# Patient Record
Sex: MALE | Race: WHITE
[De-identification: names, ages, dates, MRNs, and addresses within clinical notes are randomized per-mention and may not be internally consistent; named-entity substitution may affect disease eponyms.]

---
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
## 2021-05-10 ENCOUNTER — HOSPITAL ENCOUNTER (EMERGENCY)
Dept: HOSPITAL 47 - EC | Age: 21
Discharge: HOME | End: 2021-05-10
Payer: MEDICAID

## 2021-05-10 VITALS — SYSTOLIC BLOOD PRESSURE: 115 MMHG | DIASTOLIC BLOOD PRESSURE: 70 MMHG

## 2021-05-10 VITALS — HEART RATE: 75 BPM | RESPIRATION RATE: 18 BRPM | TEMPERATURE: 97.9 F

## 2021-05-10 DIAGNOSIS — F17.200: ICD-10-CM

## 2021-05-10 DIAGNOSIS — F32.9: Primary | ICD-10-CM

## 2021-05-10 DIAGNOSIS — Z20.822: ICD-10-CM

## 2021-05-10 PROCEDURE — 90471 IMMUNIZATION ADMIN: CPT

## 2021-05-10 PROCEDURE — 82075 ASSAY OF BREATH ETHANOL: CPT

## 2021-05-10 PROCEDURE — 87635 SARS-COV-2 COVID-19 AMP PRB: CPT

## 2021-05-10 PROCEDURE — 90715 TDAP VACCINE 7 YRS/> IM: CPT

## 2021-05-10 PROCEDURE — 99284 EMERGENCY DEPT VISIT MOD MDM: CPT
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
## 2021-06-19 ENCOUNTER — HOSPITAL ENCOUNTER (EMERGENCY)
Dept: HOSPITAL 47 - EC | Age: 21
LOS: 1 days | Discharge: HOME | End: 2021-06-20
Payer: COMMERCIAL

## 2021-06-19 DIAGNOSIS — F41.9: ICD-10-CM

## 2021-06-19 DIAGNOSIS — F32.9: ICD-10-CM

## 2021-06-19 DIAGNOSIS — R10.12: Primary | ICD-10-CM

## 2021-06-19 DIAGNOSIS — F90.9: ICD-10-CM

## 2021-06-19 DIAGNOSIS — F17.290: ICD-10-CM

## 2021-06-19 DIAGNOSIS — F20.9: ICD-10-CM

## 2021-06-19 LAB
BASOPHILS # BLD AUTO: 0.1 K/UL (ref 0–0.2)
BASOPHILS NFR BLD AUTO: 1 %
EOSINOPHIL # BLD AUTO: 0.4 K/UL (ref 0–0.7)
EOSINOPHIL NFR BLD AUTO: 4 %
ERYTHROCYTE [DISTWIDTH] IN BLOOD BY AUTOMATED COUNT: 5.06 M/UL (ref 4.3–5.9)
ERYTHROCYTE [DISTWIDTH] IN BLOOD: 12.3 % (ref 11.5–15.5)
HCT VFR BLD AUTO: 43.1 % (ref 39–53)
HGB BLD-MCNC: 15 GM/DL (ref 13–17.5)
LYMPHOCYTES # SPEC AUTO: 3.1 K/UL (ref 1–4.8)
LYMPHOCYTES NFR SPEC AUTO: 26 %
MCH RBC QN AUTO: 29.7 PG (ref 25–35)
MCHC RBC AUTO-ENTMCNC: 34.8 G/DL (ref 31–37)
MCV RBC AUTO: 85.2 FL (ref 80–100)
MONOCYTES # BLD AUTO: 0.8 K/UL (ref 0–1)
MONOCYTES NFR BLD AUTO: 6 %
NEUTROPHILS # BLD AUTO: 7.4 K/UL (ref 1.3–7.7)
NEUTROPHILS NFR BLD AUTO: 62 %
PLATELET # BLD AUTO: 334 K/UL (ref 150–450)
WBC # BLD AUTO: 11.9 K/UL (ref 4–11)

## 2021-06-19 PROCEDURE — 82150 ASSAY OF AMYLASE: CPT

## 2021-06-19 PROCEDURE — 83690 ASSAY OF LIPASE: CPT

## 2021-06-19 PROCEDURE — 74177 CT ABD & PELVIS W/CONTRAST: CPT

## 2021-06-19 PROCEDURE — 96374 THER/PROPH/DIAG INJ IV PUSH: CPT

## 2021-06-19 PROCEDURE — 99284 EMERGENCY DEPT VISIT MOD MDM: CPT

## 2021-06-19 PROCEDURE — 80053 COMPREHEN METABOLIC PANEL: CPT

## 2021-06-19 PROCEDURE — 85025 COMPLETE CBC W/AUTO DIFF WBC: CPT

## 2021-06-19 NOTE — ED
Abdominal Pain HPI





- General


Chief Complaint: Abdominal Pain


Stated Complaint: Abd Pain


Time Seen by Provider: 06/19/21 23:32


Source: patient, family


Mode of arrival: ambulatory


Limitations: no limitations





- History of Present Illness


Initial Comments: 





This patient states that his 30 pound dog pounced on him and he is having left 

upper quadrant pain since that time.  Prescription about 3 hours ago.


MD Complaint: abdominal pain


Onset/Timing: 3


-: hour(s)


Location: [facility]


Radiation: none


Migration to: no migration


Severity: moderate


Quality: aching


Consistency: constant


Improves With: nothing


Worsens With: nothing


Associated Symptoms: denies other symptoms





- Related Data


                                Home Medications











 Medication  Instructions  Recorded  Confirmed


 


No Known Home Medications  04/30/19 04/30/19











                                    Allergies











Allergy/AdvReac Type Severity Reaction Status Date / Time


 


No Known Allergies Allergy   Verified 06/19/21 23:21














Review of Systems


ROS Statement: 


Those systems with pertinent positive or pertinent negative responses have been 

documented in the HPI.





ROS Other: All systems not noted in ROS Statement are negative.


Constitutional: Denies: fever, chills


Respiratory: Denies: cough, dyspnea


Cardiovascular: Denies: chest pain, palpitations


Gastrointestinal: Reports: abdominal pain.  Denies: nausea, vomiting, diarrhea, 

constipation, hematemesis


Genitourinary: Denies: dysuria, hematuria, testicular pain, testicular mass


Musculoskeletal: Denies: back pain


Skin: Denies: rash


Neurological: Denies: headache, weakness, numbness





Past Medical History


Past Medical History: No Reported History


Additional Past Medical History / Comment(s): schizophrenia


History of Any Multi-Drug Resistant Organisms: None Reported


Past Surgical History: No Surgical Hx Reported


Past Psychological History: ADD/ADHD, Anxiety, Depression, Schizophrenia


Smoking Status: Current every day smoker, Vaper


Past Alcohol Use History: None Reported


Past Drug Use History: None Reported





General Exam


Limitations: no limitations


General appearance: alert, in no apparent distress


Head exam: Present: atraumatic, normocephalic


Eye exam: Present: normal appearance.  Absent: scleral icterus, conjunctival 

injection


ENT exam: Present: normal oropharynx


Respiratory exam: Present: normal lung sounds bilaterally.  Absent: respiratory 

distress, wheezes, rales, rhonchi, stridor


Cardiovascular Exam: Present: regular rate, normal rhythm, normal heart sounds. 

Absent: systolic murmur, diastolic murmur, rubs, gallop


GI/Abdominal exam: Present: soft.  Absent: distended, tenderness, guarding, 

rebound, rigid, mass


Extremities exam: Present: normal inspection, normal capillary refill.  Absent: 

pedal edema, calf tenderness


Back exam: Present: normal inspection.  Absent: CVA tenderness (R), CVA 

tenderness (L)


Neurological exam: Present: alert


Skin exam: Present: warm, dry, intact, normal color.  Absent: rash





Course


                                   Vital Signs











  06/19/21 06/19/21





  23:18 23:21


 


Pulse Rate 77 65


 


Respiratory 20 22





Rate  


 


Blood Pressure 137/78 123/75


 


O2 Sat by Pulse 99 100





Oximetry  














Medical Decision Making





- Lab Data


Result diagrams: 


                                 06/19/21 23:40





                                 06/19/21 23:40


                                   Lab Results











  06/19/21 06/19/21 Range/Units





  23:40 23:40 


 


WBC  11.9 H   (4.0-11.0)  k/uL


 


RBC  5.06   (4.30-5.90)  m/uL


 


Hgb  15.0   (13.0-17.5)  gm/dL


 


Hct  43.1   (39.0-53.0)  %


 


MCV  85.2   (80.0-100.0)  fL


 


MCH  29.7   (25.0-35.0)  pg


 


MCHC  34.8   (31.0-37.0)  g/dL


 


RDW  12.3   (11.5-15.5)  %


 


Plt Count  334   (150-450)  k/uL


 


MPV  6.5   


 


Neutrophils %  62   %


 


Lymphocytes %  26   %


 


Monocytes %  6   %


 


Eosinophils %  4   %


 


Basophils %  1   %


 


Neutrophils #  7.4   (1.3-7.7)  k/uL


 


Lymphocytes #  3.1   (1.0-4.8)  k/uL


 


Monocytes #  0.8   (0-1.0)  k/uL


 


Eosinophils #  0.4   (0-0.7)  k/uL


 


Basophils #  0.1   (0-0.2)  k/uL


 


Sodium   141  (137-145)  mmol/L


 


Potassium   3.8  (3.5-5.1)  mmol/L


 


Chloride   104  ()  mmol/L


 


Carbon Dioxide   27  (22-30)  mmol/L


 


Anion Gap   10  mmol/L


 


BUN   15  (9-20)  mg/dL


 


Creatinine   0.87  (0.66-1.25)  mg/dL


 


Est GFR (CKD-EPI)AfAm   >90  (>60 ml/min/1.73 sqM)  


 


Est GFR (CKD-EPI)NonAf   >90  (>60 ml/min/1.73 sqM)  


 


Glucose   93  (74-99)  mg/dL


 


Calcium   9.6  (8.4-10.2)  mg/dL


 


Total Bilirubin   1.2  (0.2-1.3)  mg/dL


 


AST   29  (17-59)  U/L


 


ALT   35  (4-49)  U/L


 


Alkaline Phosphatase   81  ()  U/L


 


Total Protein   7.0  (6.3-8.2)  g/dL


 


Albumin   4.4  (3.5-5.0)  g/dL


 


Amylase   73  ()  U/L


 


Lipase   80  ()  U/L














Disposition


Clinical Impression: 


 Abdominal pain





Disposition: HOME SELF-CARE


Condition: Good


Instructions (If sedation given, give patient instructions):  Abdominal Pain 

(ED)


Is patient prescribed a controlled substance at d/c from ED?: No


Referrals: 


None,Stated [Primary Care Provider] - 1-2 days

## 2021-06-20 VITALS — RESPIRATION RATE: 18 BRPM | HEART RATE: 70 BPM | SYSTOLIC BLOOD PRESSURE: 113 MMHG | DIASTOLIC BLOOD PRESSURE: 71 MMHG

## 2021-06-20 LAB
ALBUMIN SERPL-MCNC: 4.4 G/DL (ref 3.5–5)
ALP SERPL-CCNC: 81 U/L (ref 38–126)
ALT SERPL-CCNC: 35 U/L (ref 4–49)
AMYLASE SERPL-CCNC: 73 U/L (ref 30–110)
ANION GAP SERPL CALC-SCNC: 10 MMOL/L
AST SERPL-CCNC: 29 U/L (ref 17–59)
BUN SERPL-SCNC: 15 MG/DL (ref 9–20)
CALCIUM SPEC-MCNC: 9.6 MG/DL (ref 8.4–10.2)
CHLORIDE SERPL-SCNC: 104 MMOL/L (ref 98–107)
CO2 SERPL-SCNC: 27 MMOL/L (ref 22–30)
GLUCOSE SERPL-MCNC: 93 MG/DL (ref 74–99)
LIPASE SERPL-CCNC: 80 U/L (ref 23–300)
POTASSIUM SERPL-SCNC: 3.8 MMOL/L (ref 3.5–5.1)
PROT SERPL-MCNC: 7 G/DL (ref 6.3–8.2)
SODIUM SERPL-SCNC: 141 MMOL/L (ref 137–145)

## 2021-06-20 NOTE — CT
EXAMINATION TYPE: CT abdomen pelvis w con

 

DATE OF EXAM: 6/20/2021

 

COMPARISON: 4/30/2019

 

HISTORY: [facility] pain

 

CT DLP: 568.2 mGycm

Automated exposure control for dose reduction was used.

 

CONTRAST: 

Performed with IV Contrast, patient injected with 100 mL of Isovue 300.

 

The lung bases are clear. There is no pleural effusion. Heart size is normal. There is no pericardial
 effusion.

 

Liver spleen stomach pancreas gallbladder appear intact. The bile ducts are not dilated. The liver is
 borderline enlarged and measures 20.7 cm in length.

 

There is no adrenal mass. Kidneys show satisfactory contrast opacification. There is no hydronephrosi
s. Ureters are not dilated. There is no retroperitoneal adenopathy. Delayed images show normal renal 
excretion. The bladder distends smoothly. There is no inguinal hernia. There is no free fluid in the 
pelvis. Appendix appears to measure up to 7 mm. I do not see any surrounding inflammation.

 

There is no mesenteric edema. There is no ascites or free air. There is no bowel obstruction.

 

The lumbar vertebra have normal alignment. There is no compression fracture. Disc spaces are fairly n
ormal. The bony pelvis is intact. Hip joints are intact. There is no hip dysplasia.

 

IMPRESSION:

Negative CT scan abdomen and pelvis. I do not see a cause for left upper quadrant pain. No adverse ch
ann compared to old exam.

## 2021-07-21 ENCOUNTER — HOSPITAL ENCOUNTER (INPATIENT)
Dept: HOSPITAL 47 - EC | Age: 21
LOS: 6 days | Discharge: HOME | DRG: 885 | End: 2021-07-27
Attending: PSYCHIATRY & NEUROLOGY | Admitting: PSYCHIATRY & NEUROLOGY
Payer: COMMERCIAL

## 2021-07-21 DIAGNOSIS — F15.10: ICD-10-CM

## 2021-07-21 DIAGNOSIS — Z59.0: ICD-10-CM

## 2021-07-21 DIAGNOSIS — G47.00: ICD-10-CM

## 2021-07-21 DIAGNOSIS — F20.9: ICD-10-CM

## 2021-07-21 DIAGNOSIS — F90.9: ICD-10-CM

## 2021-07-21 DIAGNOSIS — F32.9: ICD-10-CM

## 2021-07-21 DIAGNOSIS — F29: Primary | ICD-10-CM

## 2021-07-21 DIAGNOSIS — F17.200: ICD-10-CM

## 2021-07-21 DIAGNOSIS — Z78.1: ICD-10-CM

## 2021-07-21 DIAGNOSIS — F12.10: ICD-10-CM

## 2021-07-21 DIAGNOSIS — F19.10: ICD-10-CM

## 2021-07-21 DIAGNOSIS — F41.9: ICD-10-CM

## 2021-07-21 DIAGNOSIS — R45.851: ICD-10-CM

## 2021-07-21 PROCEDURE — 80306 DRUG TEST PRSMV INSTRMNT: CPT

## 2021-07-21 PROCEDURE — 99285 EMERGENCY DEPT VISIT HI MDM: CPT

## 2021-07-21 PROCEDURE — 82075 ASSAY OF BREATH ETHANOL: CPT

## 2021-07-21 SDOH — ECONOMIC STABILITY - HOUSING INSECURITY: HOMELESSNESS: Z59.0

## 2021-07-21 NOTE — ED
Psych HPI





- General


Source: patient, family, police, EMS, RN notes reviewed


Mode of arrival: EMS


Limitations: no limitations





<Samuel Miles - Last Filed: 07/21/21 14:49>





<Steve Barrios - Last Filed: 07/21/21 18:25>





- General


Chief Complaint: Psychiatric Symptoms


Stated Complaint: Suicidal


Time Seen by Provider: 07/21/21 14:22





- History of Present Illness


Initial Comments: 





This is a 21-year-old male presents emergency department via EMS with police for

psychiatric treatment.  Patient reportedly has been having increasing depression

and which she states has worsened secondary to his living situation.  Patient 

states that his girlfriend recently broke up with him in which he now is 

essentially homeless.  Patient states his depression worsened the point where he

wanted to kill himself today.  Patient was found in a tree stand with extension 

cord wrapped around his neck.  Patient denied jumped denies any trauma this 

time.  Patient does admit to marijuana use and states that the house that he was

having to stay at their using methamphetamines quite frequently.  Patient denies

any alcohol abuse.  Patient does have a history of psychiatric issues. 

(Samuel Miles)





- Related Data


                                Home Medications











 Medication  Instructions  Recorded  Confirmed


 


No Known Home Medications  04/30/19 07/21/21











                                    Allergies











Allergy/AdvReac Type Severity Reaction Status Date / Time


 


No Known Allergies Allergy   Verified 07/21/21 17:36














Review of Systems


ROS Other: All systems not noted in ROS Statement are negative.





<Samuel Miles - Last Filed: 07/21/21 14:49>


ROS Other: All systems not noted in ROS Statement are negative.





<Steve Barrios - Last Filed: 07/21/21 18:25>


ROS Statement: 


Those systems with pertinent positive or pertinent negative responses have been 

documented in the HPI.








Past Medical History


Past Medical History: No Reported History


Additional Past Medical History / Comment(s): schizophrenia


History of Any Multi-Drug Resistant Organisms: None Reported


Past Surgical History: No Surgical Hx Reported


Past Psychological History: ADD/ADHD, Anxiety, Depression, Schizophrenia


Smoking Status: Current every day smoker, Vaper


Past Alcohol Use History: None Reported


Past Drug Use History: None Reported





<Samuel Miles - Last Filed: 07/21/21 14:49>





General Exam


Limitations: no limitations


General appearance: alert, in no apparent distress


Head exam: Present: atraumatic, normocephalic, normal inspection


Eye exam: Present: normal appearance, PERRL, EOMI.  Absent: scleral icterus, 

conjunctival injection, periorbital swelling


ENT exam: Present: normal exam, normal oropharynx, mucous membranes moist


Neck exam: Present: normal inspection, full ROM.  Absent: tenderness, 

meningismus, lymphadenopathy


Respiratory exam: Present: normal lung sounds bilaterally.  Absent: respiratory 

distress, wheezes, rales, rhonchi, stridor


Cardiovascular Exam: Present: regular rate, normal rhythm, normal heart sounds. 

Absent: systolic murmur, diastolic murmur, rubs, gallop, clicks


Neurological exam: Present: alert, oriented X3, CN II-XII intact


Psychiatric exam: Present: depressed


Skin exam: Present: warm, dry, intact, normal color.  Absent: rash





<Samuel Miles - Last Filed: 07/21/21 14:49>





Course





                                   Vital Signs











  07/21/21 07/21/21





  14:24 17:43


 


Temperature 97.5 F L 


 


Pulse Rate 102 H 68


 


Respiratory 18 20





Rate  


 


Blood Pressure 116/73 127/75


 


O2 Sat by Pulse 98 98





Oximetry  














Medical Decision Making





<Steve Barrios - Last Filed: 07/21/21 18:25>





- Medical Decision Making





Patient seen by mental health services with plans for admission.  Patient 

reevaluated by myself and clinical certificate completed.  Patient admits to hav

ing depression with thoughts of self-harm by hanging.  Patient also admits to 

making threats.  Patient has not been sleeping well.  Patient has not been 

eating well. (Steve Barrios)





- Lab Data





                                   Lab Results











  07/21/21 Range/Units





  17:45 


 


Urine Opiates Screen  Not Detected  (NotDetected)  


 


Ur Oxycodone Screen  Not Detected  (NotDetected)  


 


Urine Methadone Screen  Not Detected  (NotDetected)  


 


Ur Propoxyphene Screen  Not Detected  (NotDetected)  


 


Ur Barbiturates Screen  Not Detected  (NotDetected)  


 


U Tricyclic Antidepress  Not Detected  (NotDetected)  


 


Ur Phencyclidine Scrn  Not Detected  (NotDetected)  


 


Ur Amphetamines Screen  Detected H  (NotDetected)  


 


U Methamphetamines Scrn  Detected H  (NotDetected)  


 


U Benzodiazepines Scrn  Not Detected  (NotDetected)  


 


Urine Cocaine Screen  Not Detected  (NotDetected)  


 


U Marijuana (THC) Screen  Detected H  (NotDetected)  














Disposition





<Samuel Miles - Last Filed: 07/21/21 14:49>


Decision Time: 18:25





<Steve Barrios - Last Filed: 07/21/21 18:25>


Clinical Impression: 


 Depression, Suicidal ideation





Disposition: TRANSFER TO PSYCH HOSP/UNIT


Condition: Stable


Referrals: 


None,Stated [Primary Care Provider] - 1-2 days

## 2021-07-22 VITALS — RESPIRATION RATE: 14 BRPM

## 2021-07-22 RX ADMIN — NICOTINE SCH: 14 PATCH, EXTENDED RELEASE TRANSDERMAL at 03:50

## 2021-07-22 RX ADMIN — NICOTINE SCH: 14 PATCH, EXTENDED RELEASE TRANSDERMAL at 07:40

## 2021-07-22 NOTE — P.HP
Psychiatric H&P





- .


H&P Date: 07/22/21


History & Physical: 


                                    Allergies











Allergy/AdvReac Type Severity Reaction Status Date / Time


 


No Known Allergies Allergy   Verified 07/21/21 17:36








                                   Vital Signs











Temp  97.3 F L  07/22/21 06:48


 


Pulse  69   07/22/21 06:48


 


Resp  18   07/22/21 06:48


 


BP  127/61   07/22/21 06:48


 


Pulse Ox  98   07/21/21 22:09








                                 Intake & Output











 07/21/21 07/22/21 07/22/21





 18:59 06:59 18:59


 


Weight 65.771 kg 62 kg 








                             Laboratory Last Values











Urine Opiates Screen  Not Detected  (NotDetected)   07/21/21  17:45    


 


Ur Oxycodone Screen  Not Detected  (NotDetected)   07/21/21  17:45    


 


Urine Methadone Screen  Not Detected  (NotDetected)   07/21/21  17:45    


 


Ur Propoxyphene Screen  Not Detected  (NotDetected)   07/21/21  17:45    


 


Ur Barbiturates Screen  Not Detected  (NotDetected)   07/21/21  17:45    


 


U Tricyclic Antidepress  Not Detected  (NotDetected)   07/21/21  17:45    


 


Ur Phencyclidine Scrn  Not Detected  (NotDetected)   07/21/21  17:45    


 


Ur Amphetamines Screen  Detected  (NotDetected)  H  07/21/21  17:45    


 


U Methamphetamines Scrn  Detected  (NotDetected)  H  07/21/21  17:45    


 


U Benzodiazepines Scrn  Not Detected  (NotDetected)   07/21/21  17:45    


 


Urine Cocaine Screen  Not Detected  (NotDetected)   07/21/21  17:45    


 


U Marijuana (THC) Screen  Detected  (NotDetected)  H  07/21/21  17:45    











07/22/21 13:12


IDENTIFYING DATA: Patient is a 21-year-old  male who has a history of 

polysubstance abuse and is currently homeless





HPI: Patient presented to the hospital via EMS with police for psychiatric 

treatment and valuation.  Patient apparently had reported an increase in his 

depression due to his living situation in the ER.  He was claiming that he is 

essentially homeless at this time.  Patient also mentioned that he had thoughts 

of wanting to kill himself today on the day of coming into the hospital.  

Patient was found in a tree stand with extension cord wrapped around his neck 

according to ER report.  He was admitting to methamphetamine and cannabis use.  

Patient was petitioned by his aunt who claims that patient was posting suicidal 

messages on face book and "sending videos with cord around neck, standing on the

edge, police arrived, threatened to jump from tree stand, lack of support, 

feeling hopeless.  Inconsistent with treatment, stressing about medical 

conditions and being alone".  Patient was noted by staff to be aggressive and 

agitated in the hallways early this afternoon and was about to receive prn IM 

medication for agitation however patient went down to the floor and began 

shaking and required restraining by staff members. the A team was called to 

evaluate patient for possible seizure-like episode however patient did not lose 

consciousness did not lose any bowel incontinence or did not have any postictal 

confusion or tongue biting therefore it was decided that patient will be 

monitored on the psychiatric unit for now.  The writer was called to evaluate 

patient as he was being restrained on the floor and patient was loud aggressive 

and making threatening comments towards staff members.  He threatened to "go to 

[place] with this".  He was difficult to redirect during conversation and was 

brought to the seclusion room and put into restraints.  Patient was minimally 

cooperative with writer today and claims that he is feeling calmer after 

receiving the Haldol and Ativan.  She claims that he does not know why he is 

hospital and "misses my daughter".  She appeared to have fairly poor insight and

judgment and was impulsive.  He states that he was using drugs including 

methamphetamine and cannabis.  He states that he is homeless at this time.  He 

followed minimal directions and appeared to be tearful.





Patient denies any suicidal or homicidal ideations intent or plan.  At this time

patient denies any auditory or visual hallucinations.  





PAST PSYCHIATRIC HISTORY: Unable to gather further psychiatric history.  Patient

has no previous psychiatric admissions.





PMH: Unable to obtain





ALLERGIES: as per EMR





CHEMICAL DEPENDENCY HISTORY: as per HPI





FAMILY PSYCHIATRIC/SUBSTANCE USE HISTORY:  Unable to obtain





SOCIAL HISTORY: He states that he is currently homeless.  He states that he has 

one daughter.  Unable to obtain further social history.





MENTAL STATUS EXAM: 


General Appearance: Patient appears to be disheveled in appearance, stated age 

is alert, aggressive and loud. Patient appears to have poor hygiene and groomin

g.


Behavior: Aggressive, uncooperative


Speech: Patient's speech is loud and threatening


Mood/Affect: Patient reports their mood is "fine", affect is incongruent 


Suicidality/Homicidality:  Patient denies having any homicidal ideation intent 

or plan. Denies any suicidal ideations intent or plan  


Perceptions: Patient denies any visual hallucinations and denies any auditory 

hallucinations


Though content/process: Bizarre, threatening.  [place]. 


Memory and concentration: AOX2-3, grossly intact for the purposes of this 

session. Cannot spell "WORLD" backwards


Judgment and insight: poor





STRENGTHS/WEAKNESSES: strength is that patient is resilient. Weakness is that 

patient has poor judgment and is impulsive





INTELLECT: average





IMPRESSIONS: 


Psychosis unspecified, rule out secondary to polysubstance abuse


Methamphetamine abuse


Cannabis use disorder


Nicotine dependence





PLAN: 


-Patient is admitted under involuntary status to [facility] for stabilization of 

psychiatric symptoms and safety. Patient has not signed  medication consent and 

is placed in patient's chart. A second certification was completed and along 

with petition will be filed for court.


-Medications : Will start patient on Zyprexa by mouth 5 mg daily at bedtime for 

psychosis/insomnia/mood stabilization.


-Ativan, Benadryl, and Haldol PRN for agitation/aggression


-Patient was informed of the risks, benefits and side effects of the medication 

and patient verbally consented to taking the medications. Patient signed med 

consent form and was placed in chart.


-Internal Medicine consult to perform medical evaluation and physical.


-NRT - nicotine patch


-SW on board for discharge planning. Encourage patient to participate in groups 

to work on coping skills. Will await deferral and court date.  We'll attempt to 

speak with patient about substance use rehab and other treatment options for his

substance abuse.


07/22/21 13:17

## 2021-07-22 NOTE — P.MHFACE
Face to Face Restrain/Seclus





- Evaluation


Patient's Immediate Situation: Endangers self safety, Endangers staff safety


Patient's Immediate Situation - Comment: 





aggressive, loud and threatening


Patient's Reaction to the Intervention: Uncooperative, Angry, Hostile, Moon

gerent, Aggressive


Patient's Reaction to the Intervention - Comment: 





combative


Patient's Medical & Behavioral Condition: Alert, Agitated


Need to Continue or Terminate Restraint or Seclusion: Continue


Need to Continue or Terminate Restraint/Seclusion - Comment: 





patient did not respond to redirection and behavioral interventions to de 

escalate and required PRN IM medications however was still aggressive and 

combative, yelling profanities.


Face to Face Eval of Restraint Date: 07/22/21


Face to Face Eval of Restraint Time: 01:00

## 2021-07-23 RX ADMIN — PALIPERIDONE SCH MG: 3 TABLET, EXTENDED RELEASE ORAL at 21:41

## 2021-07-23 RX ADMIN — NICOTINE SCH: 14 PATCH, EXTENDED RELEASE TRANSDERMAL at 08:35

## 2021-07-23 NOTE — P.PN
Progress Note - Text


Progress Note Date: 07/23/21





Interval History:


Patient was seen lying in the quiet room this morning sleeping.  Patient appea

red to be fairly sedated and minimally engaged with writer.  He awoke briefly 

however went back to sleep.  He answered a few questions.  He states that he is 

doing "all right" and did not have any overnight complaints.  He states that 

"the medications making me tired".  He was agreeable to take another 

antipsychotic.  He claims that "I don't want to talk about it" when asked about 

why he came to the hospital.  He is denying any depression or anxiety today. At 

this time patient denies any suicidal or homical ideations, intent or plan. 

Patient denies any auditory, visual hallucinations and denies any paranoia or 

delusions.





Mental Status Exam:


General Appearance: Patient appears to be disheveled in appearance, stated age 

is somnolent, constricted. Patient appears to have poor hygiene and grooming.


Behavior: Patient is laying in his bed, no agitation.  Somnolent.


Speech: Patient's speech is soft and concrete


Mood/Affect: Patient reports their mood is "ok", affect is incongruent and 

constricted


Suicidality/Homicidality:  Patient denies having any homicidal ideation intent 

or plan. Denies any suicidal ideations intent or plan  


Perceptions: Patient denies any visual hallucinations and denies any auditory 

hallucinations


Though content/process: Poverty of content, poverty of speech.  Logical.


Memory and concentration: AOX2-3, grossly intact for the purposes of this 

session.


Judgment and insight: poor





Assessment


Psychosis unspecified, rule out secondary to polysubstance abuse


Methamphetamine abuse


Cannabis use disorder


Nicotine dependence





Plan:


-Patient continues to meet criteria for inpatient psychiatric admission for 

symptom stabilization and safety. Patient has signed adult voluntary form and 

medication consent and was placed in patient's chart.


-Medications: Discontinued Zyprexa due to oversedation and replaced with 

paliperidone by mouth 3 mg daily at bedtime for mood stabilization/psychosis.  

If needed this can be increased over the weekend.


-When necessary Ativan, benadryl and Haldol for agitation/aggression.


-NRT - nicotine patch


-SW on board for discharge planning.  Encouraged the patient to participate in 

milieu.  Patient deferred an agreed to continue treatment with his  

today. We'll attempt to speak with patient about substance use rehab and other 

treatment options for his substance abuse.

## 2021-07-24 VITALS — HEART RATE: 59 BPM | TEMPERATURE: 98 F

## 2021-07-24 VITALS — SYSTOLIC BLOOD PRESSURE: 113 MMHG | DIASTOLIC BLOOD PRESSURE: 57 MMHG

## 2021-07-24 RX ADMIN — NICOTINE SCH: 14 PATCH, EXTENDED RELEASE TRANSDERMAL at 09:04

## 2021-07-24 RX ADMIN — PALIPERIDONE SCH MG: 3 TABLET, EXTENDED RELEASE ORAL at 21:57

## 2021-07-24 NOTE — P.PN
Progress Note - Text


Progress Note Date: 07/24/21





Interval History:


Patient was seen wandering the hallways and was directable and agreeable to sp

guille with writer in the office.  This is a 21-year-old single occasion male.  

Patient was admitted due to having thoughts of suicide and bizarre thinking.  

Currently he is being treated for psychosis and methamphetamine use disorder.  

He indicated tolerating well.  On well.  Patient states he woke up this morning 

feeling better.  He states that he slept well last night.  Efforts are made 

during this session improving his insight on how methamphetamine use is 

affecting his physical and mental well-being.  Patient said he will consider 

going to NA meetings to help maintain sobriety.. At this time patient denies any

suicidal or homical ideations, intent or plan. Patient denies any auditory, 

visual hallucinations and denies any paranoia or delusions. Patient denies any 

side effects from the medications and has been compliant with meds. 





Mental Status Exam:


General Appearance: [Patient appears to be stated age is alert, directable, 

difficult to engage, has poor hygiene


Behavior: Patient is calmly seated without any agitated behavior.


Speech: Patient's speech is fluent and nonpressured. 


Mood/Affect: Mood is improving mildly, affect is congruent and constricted. 


Suicidality/Homicidality:  Patient denies having any suicidal or homicidal 

ideation intent or plan.  


Perceptions: Patient denies any visual hallucinations and denies any auditory 

hallucinations  


Though content/process: There is no evidence of any delusional thought content 

and thought process is linear and goal-directed. 


Memory and concentration: AOX3, grossly intact for the purposes of this session


Judgment and insight: Improving mildly





Assessment


- Psychosis and specified rule out substance induced psychosis


- Methamphetamine use disorder


-Cannabis use disorder


-Nicotine use disorder








Plan:


-Patient continues to meet criteria for inpatient psychiatric admission for 

symptom stabilization and safety. 


-Medications: Continue paliperidone 3 mg


- Efforts are  made during the session improving his insight into how 

methamphetamine use affected his health and mental well-being, encouraged NA 

meetings consider substance abuse treatment


-When necessary Ativan and Haldol for agitation/aggression.


-NRT - nicotine patch


-SW on board for discharge planning.  Encouraged the patient to participate in 

milieu. Currently awaiting deferral with  and court date.

## 2021-07-25 RX ADMIN — PALIPERIDONE SCH MG: 3 TABLET, EXTENDED RELEASE ORAL at 20:48

## 2021-07-25 RX ADMIN — NICOTINE SCH PATCH: 14 PATCH, EXTENDED RELEASE TRANSDERMAL at 18:54

## 2021-07-25 RX ADMIN — NICOTINE SCH: 14 PATCH, EXTENDED RELEASE TRANSDERMAL at 08:57

## 2021-07-25 NOTE — P.PN
Progress Note - Text


Progress Note Date: 07/25/21





Interval History:


Patient was seen wandering the hallways and was directable and agreeable to sp

guille with writer in the office.  This is a 21-year-old single occasion male.  

Patient was admitted due to having thoughts of suicide and bizarre thinking.  

Currently he is being treated for psychosis and methamphetamine use disorder.  

Patient reported feeling better.  He states that he slept well last night.  

Patient states his sobriety plan is to stay away from people that are still 

using methamphetamine and might consider going to NA meetings to help maintain 

sobriety. Efforts are made during this session improving his insight on how 

methamphetamine use is affecting his physical and mental well-being.  . At this 

time patient denies any suicidal or homical ideations, intent or plan. Patient 

denies any auditory, visual hallucinations and denies any paranoia or delusions.

Patient denies any side effects from the medications and has been compliant with

meds. 





Mental Status Exam:


General Appearance: [Patient appears to be stated age is alert, directable, 

difficult to engage, has poor hygiene


Behavior: Patient is calmly seated without any agitated behavior.


Speech: Patient's speech is fluent and nonpressured. 


Mood/Affect: Mood is improving mildly, affect is congruent and constricted. 


Suicidality/Homicidality:  Patient denies having any suicidal or homicidal 

ideation intent or plan.  


Perceptions: Patient denies any visual hallucinations and denies any auditory 

hallucinations  


Though content/process: There is no evidence of any delusional thought content 

and thought process is linear and goal-directed. 


Memory and concentration: AOX3, grossly intact for the purposes of this session


Judgment and insight: Improving mildly





Assessment


- Psychosis and specified rule out substance induced psychosis


- Methamphetamine use disorder


-Cannabis use disorder


-Nicotine use disorder








Plan:


-Patient continues to meet criteria for inpatient psychiatric admission for 

symptom stabilization and safety. 


-Medications: Continue paliperidone 3 mg nightly and monitor


- Efforts are  made during the session improving his insight into how 

methamphetamine use affected his health and mental well-being, encouraged NA 

meetings consider substance abuse treatment


-When necessary Ativan and Haldol for agitation/aggression.


-NRT - nicotine patch


-SW on board for discharge planning.  Encouraged the patient to participate in 

milieu. Currently awaiting deferral with  and court date

## 2021-07-26 RX ADMIN — NICOTINE SCH PATCH: 14 PATCH, EXTENDED RELEASE TRANSDERMAL at 20:06

## 2021-07-26 RX ADMIN — NICOTINE SCH PATCH: 14 PATCH, EXTENDED RELEASE TRANSDERMAL at 08:32

## 2021-07-26 RX ADMIN — PALIPERIDONE SCH MG: 3 TABLET, EXTENDED RELEASE ORAL at 20:03

## 2021-07-26 NOTE — P.PN
Progress Note - Text


Progress Note Date: 07/26/21





Interval History:


Patient was seen today for psychiatric follow-up regarding patient's psychosis 

and polysubstance abuse.  Patient claims that he is feeling "bored" on the unit 

when asked how he was doing over the weekend and today.  He states that he is 

not feeling depressed or anxious today.  He claims that he has been taking his 

paliperidone over the weekend is denying any problems with it.  He has fairly 

superficial insight into his condition and claims that "stop using meth whenever

I want".  He is refusing rehab at this point.  He states that he did not sleep 

well last night and was agreeable to take trazodone as needed for tonight if he 

cannot sleep again.  He appears to be fairly calm during the interaction today. 

He is denying any depression or anxiety today. At this time patient denies any 

suicidal or homical ideations, intent or plan. Patient denies any auditory, 

visual hallucinations and denies any paranoia or delusions.





Mental Status Exam:


General Appearance: Patient appears to be stated age is wake today, attempts to 

cooperate. Patient appears to have improving hygiene and grooming.


Behavior: Patient is laying in his bed, no agitation.  More cooperative today


Speech: Patient's speech is soft and concrete


Mood/Affect: Patient reports their mood is "good", affect is congruent and 

constricted


Suicidality/Homicidality:  Patient denies having any homicidal ideation intent 

or plan. Denies any suicidal ideations intent or plan  


Perceptions: Patient denies any visual hallucinations and denies any auditory 

hallucinations


Though content/process: Poverty of content, poverty of speech.  Logical.


Memory and concentration: AOX2-3, grossly intact for the purposes of this 

session.


Judgment and insight: Chronically poor, improving mildly





Assessment


Psychosis unspecified, rule out secondary to polysubstance abuse


Methamphetamine abuse


Cannabis use disorder


Nicotine dependence





Plan:


-Patient continues to meet criteria for inpatient psychiatric admission for 

symptom stabilization and safety. Patient has signed adult voluntary form and 

medication consent and was placed in patient's chart.


-Medications: paliperidone by mouth 3 mg daily at bedtime for mood 

stabilization/psychosis.  I added trazodone 50 mg daily at bedtime when 

necessary for insomnia.


-When necessary Ativan, benadryl and Haldol for agitation/aggression.


-NRT - nicotine patch


-SW on board for discharge planning.  Encouraged the patient to participate in 

milieu.  Patient signed a deferral with his  and agreeable to continue 

with treatment.  Patient is declining substance rehab at this time however is 

currently homeless.  He states that he may have friends and his mother's friend 

that he could stay with.  Patient to speak with  today to make 

arrangements for discharge planning for tomorrow.

## 2021-07-27 RX ADMIN — NICOTINE SCH PATCH: 14 PATCH, EXTENDED RELEASE TRANSDERMAL at 09:20

## 2021-07-27 NOTE — P.DS
Providers


Date of admission: 


07/21/21 20:21





Expected date of discharge: 07/27/21


Attending physician: 


Jonas Burnette MD





Consults: 





                                        





07/21/21 20:38


Consult Physician Routine 


   Consulting Provider: [facility]


   Consult Reason/Comments: medical management/history and physical


   Do you want consulting provider notified?: Yes











Primary care physician: 


Stated None








- Discharge Diagnosis(es)


(1) Unspecified psychosis


Current Visit: Yes   Status: Acute   Priority: High   





(2) Methamphetamine abuse


Current Visit: Yes   Status: Acute   Priority: High   





(3) Cannabis use disorder, mild, abuse


Current Visit: Yes   Status: Acute   Priority: Medium   





(4) Nicotine dependence


Current Visit: Yes   Status: Acute   Priority: Low   


Hospital Course: 





Admission HPI:


Admission note was completed by writer "Patient is a 21-year-old  male 

who has a history of polysubstance abuse and is currently homeless. Patient 

presented to the hospital via EMS with police for psychiatric treatment and 

valuation.  Patient apparently had reported an increase in his depression due to

his living situation in the ER.  He was claiming that he is essentially homeless

at this time.  Patient also mentioned that he had thoughts of wanting to kill 

himself today on the day of coming into the hospital.  Patient was found in a 

tree stand with extension cord wrapped around his neck according to ER report.  

He was admitting to methamphetamine and cannabis use.  Patient was petitioned by

his aunt who claims that patient was posting suicidal messages on face book and 

"sending videos with cord around neck, standing on the edge, police arrived, 

threatened to jump from tree stand, lack of support, feeling hopeless.  

Inconsistent with treatment, stressing about medical conditions and being 

alone".  Patient was noted by staff to be aggressive and agitated in the 

hallways early this afternoon and was about to receive prn IM medication for 

agitation however patient went down to the floor and began shaking and required 

restraining by staff members. the A team was called to evaluate patient for 

possible seizure-like episode however patient did not lose consciousness did not

lose any bowel incontinence or did not have any postictal confusion or tongue 

biting therefore it was decided that patient will be monitored on the 

psychiatric unit for now.  The writer was called to evaluate patient as he was 

being restrained on the floor and patient was loud aggressive and making 

threatening comments towards staff members.  He threatened to "go to [place] 

with this".  He was difficult to redirect during conversation and was brought to

the seclusion room and put into restraints.  Patient was minimally cooperative 

with writer today and claims that he is feeling calmer after receiving the 

Haldol and Ativan.  She claims that he does not know why he is hospital and 

"misses my daughter".  She appeared to have fairly poor insight and judgment and

was impulsive.  He states that he was using drugs including methamphetamine and 

cannabis.  He states that he is homeless at this time.  He followed minimal 

directions and appeared to be tearful. Patient denies any suicidal or homicidal 

ideations intent or plan.  At this time patient denies any auditory or visual 

hallucinations."





Hospital course:


Upon admission to the unit patient was initially agitated, psychotic and 

aggressive. Patient was however admitted involuntarily and ended up signing a 

deferral and agreeing to treatment with his . Patient was initially 

agitated and aggressive and required restraints and prn medications to be given 

however after that he got along well with other patients on the unit and 

followed unit protocol.  Patient was compliant with the medications and denied 

any side effects throughout hospital course.  Patient was started on palipe

ridone by mouth 3 mg daily at bedtime for mood stabilization/psychosis.  Patient

was also started on trazodone 50 mg daily at bedtime when necessary for 

insomnia.  Patient spoke of his stressors and engaged in therapy both group and 

individual.  Patient was also seen by medical team for history and physical 

exam.  Throughout the course of the hospitalization patient gradually improved 

with regards to mood, psychosis, anxiety, sleep and return back to his baseline 

level of functioning. On the day of discharge patient denied any suicidal or 

homicidal ideations intent or plan denied any auditory or visual hallucinations.

Patient endorsed wanting to live for his health and family.  The patient denied 

any access to guns or weapons.  Patient denied any paranoia and did not endorse 

any delusions.  Patient does have a significant history of substance abuse and 

was counseled on abstaining from all substances including alcohol and marijuana.

Patient was offered however declined inpatient substance-abuse rehab.  He claims

that he would like to cut back methamphetamine use on his own.  Patient was also

counseled on the medications and need for regular compliance and was encouraged 

to follow-up with their outpatient appointment for mental health and also for 

primary care.  Prior to discharge a family meeting will be arranged by social 

worker to answer any questions and ensure safety upon discharge.





Mental status exam:


General Appearance: Patient appears to be then, stated age is alert, directable,

and cooperative. Patient is in no acute distress and has improved hygiene and 

grooming 


Behavior: Patient is calmly seated without any agitated behavior.


Speech: Patient's speech is fluent and nonpressured. 


Mood/Affect: Patient reports their mood is "good", affect is congruent


Suicidality/Homicidality:  Patient denies having any suicidal or homicidal 

ideation intent or plan.  


Perceptions: Patient denies any auditory or visual hallucinations.  


Though content/process: There is no evidence of any delusional thought content 

and thought process is linear and goal-directed. 


Memory and concentration: AOX3, grossly intact for the purposes of this session.

Can spell "WORLD" backwards correctly.


Judgment and insight: chronically poor, however has improved with guarded 

prognosis





Impression:


Psychosis unspecified, rule out substance-induced psychotic episode


Methamphetamine abuse


Cannabis use disorder


Nicotine dependence





Plan:


-Continue with discharge today as patient has improved and stabilized 

psychiatrically and is not currently an imminent threat to himself and/or 

others. Patient will remain at chronically elevated risk for harm to self and/or

others due to his impulsivity and polysubstance abuse.


-Continue medications: Paliperidone by mouth 3 mg daily at bedtime for mood 

stabilization/psychosis, trazodone 50 mg daily at bedtime when necessary for 

insomnia.


-Patient was counseled on the need for medication compliance and appropriate 

follow-up at mental health and also primary care for medical issues.  Patient 

verbalized understanding and agreed.


-Social work to arrange for and conduct family meeting to ensure safety upon 

discharge and answer any questions/concerns. Social work also to arrange for 

patients follow up appointments for psychiatric care along with follow up with 

primary care provider.


-Patient counseled on abstaining from recreational drugs and marijuana and 

alcohol. Was informed/educated on the adverse effects on their physical and 

mental health. Patient verbally agreed and understood. Patient was offered 

substance abuse treatment however declined at this time.  He claims that he 

wants to cut back on his substance use on his own.


-Patient was instructed to return to the hospital or seek immediate medical care

if their psychiatric or medical symptoms do worsen or reoccur.








                                    Allergies











Allergy/AdvReac Type Severity Reaction Status Date / Time


 


No Known Allergies Allergy   Verified 07/21/21 17:36











                               Laboratory Results











Urine Opiates Screen  Not Detected  (NotDetected)   07/21/21  17:45    


 


Ur Oxycodone Screen  Not Detected  (NotDetected)   07/21/21  17:45    


 


Urine Methadone Screen  Not Detected  (NotDetected)   07/21/21  17:45    


 


Ur Propoxyphene Screen  Not Detected  (NotDetected)   07/21/21  17:45    


 


Ur Barbiturates Screen  Not Detected  (NotDetected)   07/21/21  17:45    


 


U Tricyclic Antidepress  Not Detected  (NotDetected)   07/21/21  17:45    


 


Ur Phencyclidine Scrn  Not Detected  (NotDetected)   07/21/21  17:45    


 


Ur Amphetamines Screen  Detected  (NotDetected)  H  07/21/21  17:45    


 


U Methamphetamines Scrn  Detected  (NotDetected)  H  07/21/21  17:45    


 


U Benzodiazepines Scrn  Not Detected  (NotDetected)   07/21/21  17:45    


 


Urine Cocaine Screen  Not Detected  (NotDetected)   07/21/21  17:45    


 


U Marijuana (THC) Screen  Detected  (NotDetected)  H  07/21/21  17:45    











                                   Vital Signs











Temp  98.0 F   07/24/21 06:49


 


Pulse  59 L  07/24/21 06:49


 


Resp  14   07/24/21 06:49


 


BP  113/57   07/24/21 06:49


 


Pulse Ox  98   07/22/21 18:33











Patient Condition at Discharge: Stable





Plan - Discharge Summary


Discharge Rx Participant: No


New Discharge Prescriptions: 


New


   traZODone HCL [Desyrel] 50 mg PO HS PRN 30 Days  tab


     PRN Reason: Insomnia


   Paliperidone [Invega] 3 mg PO HS 30 Days  tab.er.24


   Nicotine 14Mg/24Hr Patch [Habitrol] 1 patch TRANSDERM DAILY 14 Days  patch


Discharge Medication List





Nicotine 14Mg/24Hr Patch [Habitrol] 1 patch TRANSDERM DAILY 14 Days  patch 

07/27/21 [Rx]


Paliperidone [Invega] 3 mg PO HS 30 Days  tab.er.24 07/27/21 [Rx]


traZODone HCL [Desyrel] 50 mg PO HS PRN 30 Days  tab 07/27/21 [Rx]








Follow up Appointment(s)/Referral(s): 


[facility] [NON-STA[facility]] - 1 Week


Patient Instructions/Handouts:  How to Stop Smoking (DC), Depression (DC)


Activity/Diet/Wound Care/Special Instructions: 


Activity and diet as tolerated. Avoid the use of street drugs and alcohol. Take 

all medications as prescribed. When you are in need of refills on your 

medications please contact your medical provider and/or outpatient psychiatrist 

to have this done. Please go to scheduled outpatient appointment for aftercare 

treatment. If symptoms return or become worse, call the crisis line at 

[phone] and/or go to the nearest emergency room for evaluation. 


Discharge Disposition: HOME SELF-CARE

## 2021-08-22 ENCOUNTER — HOSPITAL ENCOUNTER (EMERGENCY)
Dept: HOSPITAL 47 - EC | Age: 21
End: 2021-08-22
Payer: COMMERCIAL

## 2021-08-22 VITALS
HEART RATE: 57 BPM | TEMPERATURE: 98.3 F | SYSTOLIC BLOOD PRESSURE: 117 MMHG | DIASTOLIC BLOOD PRESSURE: 71 MMHG | RESPIRATION RATE: 20 BRPM

## 2021-08-22 DIAGNOSIS — Z53.21: Primary | ICD-10-CM

## 2021-08-22 PROCEDURE — 99499 UNLISTED E&M SERVICE: CPT

## 2021-08-22 PROCEDURE — 93005 ELECTROCARDIOGRAM TRACING: CPT

## 2021-08-22 NOTE — ED
General Adult HPI





- General


Stated complaint: heart racing





- History of Present Illness


Initial comments: 


Advanced triage:


21-year-old male presenting to the emergency department with chief complaint of 

a racing heart.  States the symptoms can occur intermittently with no any prec

ipitating factors.  He also reports associated chest pain and he feels like his 

"chest is sinking."  States the heart rate is going up to 130 bpm according to 

his smart watch.  The sensation is located on the left side of chest without any

radiation.  Also reports associated exertional dyspnea.  States he has history 

of anxiety, depression and schizophrenia.  This may feel some collected.  States

this may feel somewhat similar to his acute anxiety. 


Patient resting comfortably in triage and playing on his phone.  Heart rate 72. 

No symptoms at this time.





- Related Data


                                  Previous Rx's











 Medication  Instructions  Recorded


 


Nicotine 14Mg/24Hr Patch [Habitrol] 1 patch TRANSDERM DAILY 14 Days 07/27/21





 patch 


 


Paliperidone [Invega] 3 mg PO HS 30 Days  tab.er.24 07/27/21


 


traZODone HCL [Desyrel] 50 mg PO HS PRN 30 Days  tab 07/27/21











                                    Allergies











Allergy/AdvReac Type Severity Reaction Status Date / Time


 


No Known Allergies Allergy   Verified 07/21/21 17:36














Review of Systems


ROS Statement: 


Those systems with pertinent positive or pertinent negative responses have been 

documented in the HPI.





ROS Other: All systems not noted in ROS Statement are negative.





Past Medical History


Past Medical History: No Reported History


Additional Past Medical History / Comment(s): schizophrenia


History of Any Multi-Drug Resistant Organisms: None Reported


Past Surgical History: No Surgical Hx Reported


Past Psychological History: ADD/ADHD, Anxiety, Depression, Schizophrenia


Smoking Status: Current every day smoker, Vaper


Past Alcohol Use History: None Reported


Past Drug Use History: None Reported





General Exam


Limitations: no limitations


General appearance: alert, in no apparent distress





Course


                                   Vital Signs











  08/22/21 08/22/21





  17:59 20:20


 


Temperature 98.2 F 98.3 F


 


Pulse Rate 70 57 L


 


Respiratory 18 20





Rate  


 


Blood Pressure 116/67 117/71


 


O2 Sat by Pulse 99 99





Oximetry  














Disposition


Referrals: 


None,Stated [Primary Care Provider] - 1-2 days

## 2022-01-11 ENCOUNTER — HOSPITAL ENCOUNTER (OUTPATIENT)
Dept: HOSPITAL 47 - RADMRIMAIN | Age: 22
Discharge: HOME | End: 2022-01-11
Attending: PSYCHIATRY & NEUROLOGY
Payer: COMMERCIAL

## 2022-01-11 DIAGNOSIS — G44.229: ICD-10-CM

## 2022-01-11 DIAGNOSIS — R41.3: Primary | ICD-10-CM

## 2022-01-11 DIAGNOSIS — Z87.820: ICD-10-CM

## 2022-01-11 PROCEDURE — 70551 MRI BRAIN STEM W/O DYE: CPT

## 2022-01-11 NOTE — MR
EXAMINATION TYPE: MR brain wo con

 

DATE OF EXAM: 1/11/2022

 

COMPARISON: CT brain March 11, 2015

 

HISTORY: Memory loss, Chronic headache. History of head injury.

 

TECHNIQUE: Multiplanar, multisequence imaging of the brain and brainstem is performed without IV cont
rast.

 

FINDINGS:  

Diffusion weighted images demonstrate no evidence of a recent infarct or other diffusion abnormality.


 

There is no extraaxial fluid collection or significant white matter signal abnormality.  The ventricu
lar system and cisternal spaces are normal in size and appearance.  The brain volume is age appropria
te. T2 Star weighted images show no suspicious intraparenchymal blood product.

 

Midline structures demonstrate normal morphology.  The craniocervical junction appears within normal 
limits. Normal vascular flow voids are present. The visualized sinuses are clear and the globes are i
ntact.

 

IMPRESSION: Unremarkable study.

## 2025-01-08 ENCOUNTER — HOSPITAL ENCOUNTER (EMERGENCY)
Dept: HOSPITAL 47 - EC | Age: 25
Discharge: HOME | End: 2025-01-08
Payer: COMMERCIAL

## 2025-01-08 VITALS
TEMPERATURE: 98.2 F | RESPIRATION RATE: 18 BRPM | HEART RATE: 52 BPM | SYSTOLIC BLOOD PRESSURE: 109 MMHG | DIASTOLIC BLOOD PRESSURE: 78 MMHG

## 2025-01-08 DIAGNOSIS — G40.409: ICD-10-CM

## 2025-01-08 DIAGNOSIS — F17.290: ICD-10-CM

## 2025-01-08 DIAGNOSIS — F15.10: Primary | ICD-10-CM

## 2025-01-08 LAB
ALBUMIN SERPL-MCNC: 4.7 G/DL (ref 3.5–5)
ALP SERPL-CCNC: 78 U/L (ref 38–126)
ALT SERPL-CCNC: 38 U/L (ref 4–49)
ANION GAP SERPL CALC-SCNC: 9 MMOL/L
AST SERPL-CCNC: 39 U/L (ref 17–59)
BASOPHILS # BLD AUTO: 0 K/UL (ref 0–0.2)
BASOPHILS NFR BLD AUTO: 1 %
BUN SERPL-SCNC: 12 MG/DL (ref 9–20)
CALCIUM SPEC-MCNC: 9.7 MG/DL (ref 8.4–10.2)
CHLORIDE SERPL-SCNC: 108 MMOL/L (ref 98–107)
CO2 SERPL-SCNC: 23 MMOL/L (ref 22–30)
EOSINOPHIL # BLD AUTO: 0.4 K/UL (ref 0–0.7)
EOSINOPHIL NFR BLD AUTO: 6 %
ERYTHROCYTE [DISTWIDTH] IN BLOOD BY AUTOMATED COUNT: 4.73 M/UL (ref 4.3–5.9)
ERYTHROCYTE [DISTWIDTH] IN BLOOD: 12.7 % (ref 11.5–15.5)
GLUCOSE SERPL-MCNC: 81 MG/DL (ref 74–99)
HCT VFR BLD AUTO: 40.7 % (ref 39–53)
HGB BLD-MCNC: 14.5 GM/DL (ref 13–17.5)
LYMPHOCYTES # SPEC AUTO: 2.4 K/UL (ref 1–4.8)
LYMPHOCYTES NFR SPEC AUTO: 35 %
MAGNESIUM SPEC-SCNC: 2.4 MG/DL (ref 1.6–2.3)
MCH RBC QN AUTO: 30.7 PG (ref 25–35)
MCHC RBC AUTO-ENTMCNC: 35.7 G/DL (ref 31–37)
MCV RBC AUTO: 86 FL (ref 80–100)
MONOCYTES # BLD AUTO: 0.5 K/UL (ref 0–1)
MONOCYTES NFR BLD AUTO: 8 %
NEUTROPHILS # BLD AUTO: 3.3 K/UL (ref 1.3–7.7)
NEUTROPHILS NFR BLD AUTO: 48 %
PLATELET # BLD AUTO: 270 K/UL (ref 150–450)
POTASSIUM SERPL-SCNC: 4.2 MMOL/L (ref 3.5–5.1)
PROT SERPL-MCNC: 7.4 G/DL (ref 6.3–8.2)
SODIUM SERPL-SCNC: 140 MMOL/L (ref 137–145)
WBC # BLD AUTO: 6.8 K/UL (ref 3.8–10.6)

## 2025-01-08 PROCEDURE — 80306 DRUG TEST PRSMV INSTRMNT: CPT

## 2025-01-08 PROCEDURE — 80053 COMPREHEN METABOLIC PANEL: CPT

## 2025-01-08 PROCEDURE — 96361 HYDRATE IV INFUSION ADD-ON: CPT

## 2025-01-08 PROCEDURE — 85025 COMPLETE CBC W/AUTO DIFF WBC: CPT

## 2025-01-08 PROCEDURE — 83735 ASSAY OF MAGNESIUM: CPT

## 2025-01-08 PROCEDURE — 80320 DRUG SCREEN QUANTALCOHOLS: CPT

## 2025-01-08 PROCEDURE — 99284 EMERGENCY DEPT VISIT MOD MDM: CPT

## 2025-01-08 PROCEDURE — 93005 ELECTROCARDIOGRAM TRACING: CPT

## 2025-01-08 PROCEDURE — 36415 COLL VENOUS BLD VENIPUNCTURE: CPT

## 2025-01-08 PROCEDURE — 96360 HYDRATION IV INFUSION INIT: CPT

## 2025-01-08 RX ADMIN — NICARDIPINE HYDROCHLORIDE STA MLS/HR: 2.5 INJECTION INTRAVENOUS at 20:55

## 2025-01-08 NOTE — ED
Seizure HPI





- General


Chief Complaint: Seizure


Stated Complaint: seizures, 


Time Seen by Provider: 01/08/25 19:59


Source: patient, family, RN notes reviewed


Mode of arrival: wheelchair


Limitations: no limitations





- History of Present Illness


Initial Comments: 


24-year-old male presents emergency department complaint of a seizure.  Patient 

states that he has a history of him but states that he never placed any 

medication.  Patient states that this was unwitnessed seizure he states he had 2

today and states he has had couple of the last 4 days.  Patient states he has 

had multiple EEGs MRIs without acute findings.  Patient has no specific 

complaint at this time.  Patient states that he has no injuries from the 

seizure.  Patient is resolving out of bed and he knows because he felt like he 

forgot something.








- Related Data


                                  Previous Rx's











 Medication  Instructions  Recorded


 


Nicotine 14Mg/24Hr Patch [Habitrol] 1 patch TRANSDERM DAILY 14 Days 07/27/21





 patch 


 


Paliperidone [Invega] 3 mg PO HS 30 Days  tab.er.24 07/27/21


 


traZODone HCL [Desyrel] 50 mg PO HS PRN 30 Days  tab 07/27/21











                                    Allergies











Allergy/AdvReac Type Severity Reaction Status Date / Time


 


No Known Allergies Allergy   Verified 01/08/25 20:05














Review of Systems


ROS Statement: 


Those systems with pertinent positive or pertinent negative responses have been 

documented in the HPI.





ROS Other: All systems not noted in ROS Statement are negative.





Past Medical History


Past Medical History: Seizure Disorder


Additional Past Medical History / Comment(s): schizophrenia


History of Any Multi-Drug Resistant Organisms: None Reported


Past Surgical History: No Surgical Hx Reported


Past Psychological History: ADD/ADHD, Anxiety, Depression, Schizophrenia


Smoking Status: Current every day smoker, Vaper


Past Alcohol Use History: None Reported


Past Drug Use History: Marijuana





General Exam


Limitations: no limitations


General appearance: alert, in no apparent distress


Head exam: Present: atraumatic, normocephalic, normal inspection


Eye exam: Present: normal appearance, PERRL, EOMI.  Absent: scleral icterus, 

conjunctival injection, periorbital swelling


ENT exam: Present: normal exam, normal oropharynx, mucous membranes moist


Neck exam: Present: normal inspection, full ROM.  Absent: tenderness, 

meningismus, lymphadenopathy


Respiratory exam: Present: normal lung sounds bilaterally.  Absent: respiratory 

distress, wheezes, rales, rhonchi, stridor


Cardiovascular Exam: Present: regular rate, normal rhythm, normal heart sounds. 

 Absent: systolic murmur, diastolic murmur, rubs, gallop, clicks


GI/Abdominal exam: Present: soft, normal bowel sounds.  Absent: distended, 

tenderness, guarding, rebound, rigid


Neurological exam: Present: alert, oriented X3, CN II-XII intact, reflexes 

normal.  Absent: motor sensory deficit


Skin exam: Present: warm, dry, intact, normal color.  Absent: rash





Course


                                   Vital Signs











  01/08/25





  20:00


 


Temperature 98.0 F


 


Pulse Rate 64


 


Respiratory 16





Rate 


 


Blood Pressure 139/76


 


O2 Sat by Pulse 100





Oximetry 














Medical Decision Making





- Medical Decision Making


Was pt. sent in by a medical professional or institution (, PA, NP, urgent 

care, hospital, or nursing home...) When possible be specific


@ -No


Did you speak to anyone other than the patient for history (EMS, parent, family,

 police, friend...)? What history was obtained from this source 


@ -No


Did you review nursing and triage notes (agree or disagree)? Why? 


@ -I reviewed and agree with nursing and triage notes


Were old charts reviewed (outside hosp., previous admission, EMS record, old 

EKG, old radiological studies, urgent care reports/EKG's, nursing home records)?

 Report findings 


@ -No old charts were reviewed


Differential Diagnosis (chest pain, altered mental status, abdominal pain women,

 abdominal pain men, vaginal bleeding, weakness, fever, dyspnea, syncope, 

headache, dizziness, GI bleed, back pain, seizure, CVA, palpatations, mental 

health, musculoskeletal)? 


@ -Differential Seizure:


Recurrent seizure disorder, febrile seizure, alcohol withdrawal, stimulants, 

meningitis, encephalitis, intercranial hemorrhage, intracranial tumor, stroke, 

eclampsia, thyrotoxicosis, hypocalcemia, hyponatremia, hypernatremia, 

hypomagnesemia, psychogenic, this is not meant to be an all-inclusive list. 





EKG interpreted by me (3pts min.).


@ -As above


X-rays interpreted by me (1pt min.).


@ -None done


CT interpreted by me (1pt min.).


@ -None done


U/S interpreted by me (1pt. min.).


@ -None done


What testing was considered but not performed or refused? (CT, X-rays, U/S, 

labs)? Why?


@ -None


What meds were considered but not given or refused? Why?


@ -None


Did you discuss the management of the patient with other professionals 

(professionals i.e. , PA, NP, lab, RT, psych nurse, , , 

teacher, , )? Give summary


@ -No


Was smoking cessation discussed for >3mins.?


@ -No


Was critical care preformed (if so, how long)?


@ -No


Were there social determinants of health that impacted care today? How? 

(Homelessness, low income, unemployed, alcoholism, drug addiction, 

transportation, low edu. Level, literacy, decrease access to med. care, care home, 

rehab)?


@ -No


Was there de-escalation of care discussed even if they declined (Discuss DNR or 

withdrawal of care, Hospice)? DNR status


@ -No


What co-morbidities impacted this encounter? (DM, HTN, Smoking, COPD, CAD, 

Cancer, CVA, ARF, Chemo, Hep., AIDS, mental health diagnosis, sleep apnea, 

morbid obesity)?


@ -Psychiatric history, drug abuse


Was patient admitted / discharged? Hospital course, mention meds given and 

route, prescriptions, significant lab abnormalities, going to OR and other 

pertinent info.


@ -Charge patient presented for possible seizure-like activity last few days.  

This was unwitnessed.  Patient is methamphetamine positive.  Laboratory studies 

otherwise unremarkable.  We discussed follow-up with psychiatric services and 

primary care physician


Undiagnosed new problem with uncertain prognosis?


@ -No


Drug Therapy requiring intensive monitoring for toxicity (Heparin, Nitro, 

Insulin, Cardizem)?


@ -No


Were any procedures done?


@ -No


Diagnosis/symptom?


@ -Methamphetamine ingestion, seizure-like activity


Acute, or Chronic, or Acute on Chronic?


@ -Acute


Uncomplicated (without systemic symptoms) or Complicated (systemic symptoms)?


@ -Uncomplicated


Side effects of treatment?


@ -No


Exacerbation, Progression, or Severe Exacerbation?


@ -No


Poses a threat to life or bodily function? How? (Chest pain, USA, MI, pneumonia,

 PE, COPD, DKA, ARF, appy, cholecystitis, CVA, Diverticulitis, Homicidal, 

Suicidal, threat to staff... and all critical care pts)


@ -No








- Lab Data


Result diagrams: 


                                 01/08/25 20:49





                                 01/08/25 20:49


                                   Lab Results











  01/08/25 01/08/25 01/08/25 Range/Units





  20:49 20:49 21:40 


 


WBC  6.8    (3.8-10.6)  k/uL


 


RBC  4.73    (4.30-5.90)  m/uL


 


Hgb  14.5    (13.0-17.5)  gm/dL


 


Hct  40.7    (39.0-53.0)  %


 


MCV  86.0    (80.0-100.0)  fL


 


MCH  30.7    (25.0-35.0)  pg


 


MCHC  35.7    (31.0-37.0)  g/dL


 


RDW  12.7    (11.5-15.5)  %


 


Plt Count  270    (150-450)  k/uL


 


MPV  7.4    


 


Neutrophils %  48    %


 


Lymphocytes %  35    %


 


Monocytes %  8    %


 


Eosinophils %  6    %


 


Basophils %  1    %


 


Neutrophils #  3.3    (1.3-7.7)  k/uL


 


Lymphocytes #  2.4    (1.0-4.8)  k/uL


 


Monocytes #  0.5    (0-1.0)  k/uL


 


Eosinophils #  0.4    (0-0.7)  k/uL


 


Basophils #  0.0    (0-0.2)  k/uL


 


Sodium   140   (137-145)  mmol/L


 


Potassium   4.2   (3.5-5.1)  mmol/L


 


Chloride   108 H   ()  mmol/L


 


Carbon Dioxide   23   (22-30)  mmol/L


 


Anion Gap   9   mmol/L


 


BUN   12   (9-20)  mg/dL


 


Creatinine   0.97   (0.66-1.25)  mg/dL


 


Est GFR (CKD-EPI)AfAm   >90   (>60 ml/min/1.73 sqM)  


 


Est GFR (CKD-EPI)NonAf   >90   (>60 ml/min/1.73 sqM)  


 


Glucose   81   (74-99)  mg/dL


 


Calcium   9.7   (8.4-10.2)  mg/dL


 


Magnesium   2.4 H   (1.6-2.3)  mg/dL


 


Total Bilirubin   1.7 H   (0.2-1.3)  mg/dL


 


AST   39   (17-59)  U/L


 


ALT   38   (4-49)  U/L


 


Alkaline Phosphatase   78   ()  U/L


 


Total Protein   7.4   (6.3-8.2)  g/dL


 


Albumin   4.7   (3.5-5.0)  g/dL


 


Urine Opiates Screen    Not Detected  (NotDetected)  


 


Ur Oxycodone Screen    Not Detected  (NotDetected)  


 


Urine Methadone Screen    Not Detected  (NotDetected)  


 


Ur Barbiturates Screen    Not Detected  (NotDetected)  


 


U Tricyclic Antidepress    Not Detected  (NotDetected)  


 


Ur Phencyclidine Scrn    Not Detected  (NotDetected)  


 


Ur Amphetamines Screen    Detected H  (NotDetected)  


 


U Methamphetamines Scrn    Detected H  (NotDetected)  


 


U Benzodiazepines Scrn    Not Detected  (NotDetected)  


 


Urine Cocaine Screen    Not Detected  (NotDetected)  


 


U Marijuana (THC) Screen    Detected H  (NotDetected)  


 


Serum Alcohol   <10   mg/dL














- EKG Data


-: EKG Interpreted by Me


EKG Comments: 





EKG performed at 20: 35 sinus bradycardia with a rate of 52  QRS 85 QT/QTc

 400/380





Disposition


Clinical Impression: 


 Methamphetamine abuse, Generalized seizure





Disposition: HOME SELF-CARE


Condition: Stable


Instructions (If sedation given, give patient instructions):  Recurrent Seizures

 in Adults (ED)


Additional Instructions: 


Please return to the Emergency Department if symptoms worsen or any other 

concerns.


Is patient prescribed a controlled substance at d/c from ED?: No


Referrals: 


None,Stated [Primary Care Provider] - 1-2 days


Time of Disposition: 22:29